# Patient Record
(demographics unavailable — no encounter records)

---

## 2025-03-06 NOTE — ASSESSMENT
[FreeTextEntry1] : -  Mr. Chaparro is a 51 year-old male with no significant history who presents for evaluation. He developed a cough in January. Cough productive of yellow phlegm. No hemoptysis. He had associated fevers. Tested negative for COVID-19. Went to urgent care and told that he had mild wheezing. No associated chest tightness or dyspnea. He was provided amoxicillin, prednisone, Hycodan cough syrup, and albuterol as necessary. He was told he has bronchitis. He did not have a CXR. He used the albuterol about 4x/day for about 1 week, then symptoms resolved.  PFTs (March 2025) with normal spirometry, lung volumes, and diffusing capacity. No bronchodilator response.  CCTA (March 2024) with noncalcified lung nodules measuring up to 3 mm in the RML and RLL. Lungs are otherwise clear.  2D-ECHO (Jan 2023) with normal LV systolic and diastolic dysfunction. Lipomatous interatrial septal hypertrophy. No significant valvular disease. Normal RV size and systolic function. No evidence of pulmonary hypertension. No pericardial effusion.  ASSESSMENT: Mild reactive airway disease, likely post-viral Postnasal drip  PLAN: - Currently with resolved symptoms - PFTs today reviewed, normal spirometry, lung volumes, and DLCO. No bronchodilator response - Hold off on chest imaging at this time given resolved symptoms, normal PFTs, and no smoking history - Prior CCTA in March 2024 with clear lungs and a sub-4 mm lung nodules that do not require further imaging per the Fleischner Society guidelines - Recommend to take Symbicort 80-4.5 mcg 2 puffs twice daily as necessary if cough/wheezing recur - Recommend to take fluticasone nasal spray 2 sprays per nostril twice daily as necessary if postnasal drip recurs - Follow-up in 6 months or sooner PRN

## 2025-03-06 NOTE — PHYSICAL EXAM
[No Acute Distress] : no acute distress [Well Nourished] : well nourished [Well Developed] : well developed [Normal Oropharynx] : normal oropharynx [Normal Appearance] : normal appearance [Supple] : supple [No Neck Mass] : no neck mass [No JVD] : no jvd [Normal Rate/Rhythm] : normal rate/rhythm [Normal Pulses] : normal pulses [Normal S1, S2] : normal s1, s2 [No Murmurs] : no murmurs [No Resp Distress] : no resp distress [No Acc Muscle Use] : no acc muscle use [Clear to Auscultation Bilaterally] : clear to auscultation bilaterally [No Abnormalities] : no abnormalities [Benign] : benign [Not Tender] : not tender [Normal Gait] : normal gait [No Clubbing] : no clubbing [No Cyanosis] : no cyanosis [No Edema] : no edema [FROM] : FROM [Normal Color/ Pigmentation] : normal color/ pigmentation [No Focal Deficits] : no focal deficits [Oriented x3] : oriented x3 [Normal Affect] : normal affect

## 2025-03-06 NOTE — CONSULT LETTER
[Dear  ___] : Dear  [unfilled], [Consult Letter:] : I had the pleasure of evaluating your patient, [unfilled]. [Please see my note below.] : Please see my note below. [Consult Closing:] : Thank you very much for allowing me to participate in the care of this patient.  If you have any questions, please do not hesitate to contact me. [Sincerely,] : Sincerely, [FreeTextEntry2] : Dr. Willie Sutton MD Fax: [phone] [FreeTextEntry3] : Asad Hightower MD Attending Physician in Pulmonary & Critical Care Medicine Associate Professor of Medicine [employer] at [facility]

## 2025-03-06 NOTE — HISTORY OF PRESENT ILLNESS
[TextBox_4] : Mr. Chaparro is a 51 year-old male with no significant history who presents for evaluation. He developed a cough in January. Cough productive of yellow phlegm. No hemoptysis. He had associated fevers. Tested negative for COVID-19. Went to urgent care and told that he had mild wheezing. No associated chest tightness or dyspnea. He was provided amoxicillin, prednisone, Hycodan cough syrup, and albuterol as necessary. He was told he has bronchitis. He did not have a CXR. He used the albuterol about 4x/day for about 1 week, then symptoms resolved.  PFTs (March 2025) with normal spirometry, lung volumes, and diffusing capacity. No bronchodilator response.  CCTA (March 2024) with noncalcified lung nodules measuring up to 3 mm in the RML and RLL. Lungs are otherwise clear.  2D-ECHO (Jan 2023) with normal LV systolic and diastolic dysfunction. Lipomatous interatrial septal hypertrophy. No significant valvular disease. Normal RV size and systolic function. No evidence of pulmonary hypertension. No pericardial effusion.

## 2025-03-06 NOTE — CONSULT LETTER
[Dear  ___] : Dear  [unfilled], [Consult Letter:] : I had the pleasure of evaluating your patient, [unfilled]. [Please see my note below.] : Please see my note below. [Consult Closing:] : Thank you very much for allowing me to participate in the care of this patient.  If you have any questions, please do not hesitate to contact me. [Sincerely,] : Sincerely, [FreeTextEntry2] : Dr. Willie Sutton MD Fax: [phone] [FreeTextEntry3] : Asad Hightower MD Attending Physician in Pulmonary & Critical Care Medicine Associate Professor of Medicine [employer] at [facility]